# Patient Record
Sex: FEMALE | Race: WHITE | NOT HISPANIC OR LATINO | URBAN - METROPOLITAN AREA
[De-identification: names, ages, dates, MRNs, and addresses within clinical notes are randomized per-mention and may not be internally consistent; named-entity substitution may affect disease eponyms.]

---

## 2019-12-05 ENCOUNTER — HISTORICAL (OUTPATIENT)
Dept: RADIOLOGY | Facility: HOSPITAL | Age: 59
End: 2019-12-05

## 2019-12-05 LAB — TSH SERPL-ACNC: 1.77 MIU/L (ref 0.36–3.74)

## 2019-12-27 ENCOUNTER — HISTORICAL (OUTPATIENT)
Dept: RADIOLOGY | Facility: HOSPITAL | Age: 59
End: 2019-12-27

## 2020-10-28 LAB — CRC RECOMMENDATION EXT: NORMAL

## 2021-09-30 LAB
HUMAN PAPILLOMAVIRUS (HPV): NORMAL
PAP RECOMMENDATION EXT: NORMAL
PAP SMEAR: NORMAL

## 2021-11-18 LAB — BMD RECOMMENDATION EXT: NORMAL

## 2021-12-30 ENCOUNTER — HISTORICAL (OUTPATIENT)
Dept: ADMINISTRATIVE | Facility: HOSPITAL | Age: 61
End: 2021-12-30

## 2021-12-30 ENCOUNTER — HISTORICAL (OUTPATIENT)
Dept: RADIOLOGY | Facility: HOSPITAL | Age: 61
End: 2021-12-30

## 2021-12-30 LAB — TSH SERPL-ACNC: 1.4 UIU/ML (ref 0.35–4.94)

## 2022-02-18 LAB — BCS RECOMMENDATION EXT: NORMAL

## 2022-04-11 ENCOUNTER — HISTORICAL (OUTPATIENT)
Dept: ADMINISTRATIVE | Facility: HOSPITAL | Age: 62
End: 2022-04-11
Payer: COMMERCIAL

## 2022-04-27 VITALS
BODY MASS INDEX: 26.37 KG/M2 | DIASTOLIC BLOOD PRESSURE: 70 MMHG | WEIGHT: 148.81 LBS | HEIGHT: 63 IN | SYSTOLIC BLOOD PRESSURE: 112 MMHG

## 2023-01-09 RX ORDER — LEVOTHYROXINE SODIUM 75 UG/1
TABLET ORAL
Qty: 90 TABLET | Refills: 3 | OUTPATIENT
Start: 2023-01-09

## 2023-01-09 NOTE — TELEPHONE ENCOUNTER
Spoke with patient she will contact her PCP, she states she did not realized the auto refill was under Dr. Yeung's name.

## 2023-01-13 ENCOUNTER — DOCUMENTATION ONLY (OUTPATIENT)
Dept: INTERNAL MEDICINE | Facility: CLINIC | Age: 63
End: 2023-01-13
Payer: COMMERCIAL

## 2023-02-24 LAB — BCS RECOMMENDATION EXT: NORMAL

## 2023-06-08 DIAGNOSIS — E03.9 HYPOTHYROIDISM, UNSPECIFIED TYPE: Primary | ICD-10-CM

## 2023-06-08 RX ORDER — LEVOTHYROXINE SODIUM 75 UG/1
75 TABLET ORAL
Qty: 90 TABLET | Refills: 1 | Status: SHIPPED | OUTPATIENT
Start: 2023-06-08 | End: 2023-10-24 | Stop reason: SDUPTHER

## 2023-06-08 RX ORDER — LEVOTHYROXINE SODIUM 75 UG/1
75 TABLET ORAL
COMMUNITY
End: 2023-06-08 | Stop reason: SDUPTHER

## 2023-10-24 ENCOUNTER — OFFICE VISIT (OUTPATIENT)
Dept: FAMILY MEDICINE | Facility: CLINIC | Age: 63
End: 2023-10-24
Payer: COMMERCIAL

## 2023-10-24 VITALS
OXYGEN SATURATION: 97 % | TEMPERATURE: 98 F | HEIGHT: 63 IN | SYSTOLIC BLOOD PRESSURE: 138 MMHG | BODY MASS INDEX: 26.4 KG/M2 | DIASTOLIC BLOOD PRESSURE: 78 MMHG | RESPIRATION RATE: 20 BRPM | WEIGHT: 149 LBS | HEART RATE: 87 BPM

## 2023-10-24 DIAGNOSIS — S46.911A MUSCLE STRAIN OF RIGHT SCAPULAR REGION, INITIAL ENCOUNTER: Primary | ICD-10-CM

## 2023-10-24 DIAGNOSIS — Z23 IMMUNIZATION DUE: ICD-10-CM

## 2023-10-24 DIAGNOSIS — E03.9 HYPOTHYROIDISM, UNSPECIFIED TYPE: ICD-10-CM

## 2023-10-24 DIAGNOSIS — F41.9 ANXIETY: ICD-10-CM

## 2023-10-24 DIAGNOSIS — M54.9 UPPER BACK PAIN ON RIGHT SIDE: ICD-10-CM

## 2023-10-24 DIAGNOSIS — E78.5 HYPERLIPIDEMIA, UNSPECIFIED HYPERLIPIDEMIA TYPE: ICD-10-CM

## 2023-10-24 PROBLEM — R79.89 ELEVATED LFTS: Status: ACTIVE | Noted: 2023-10-24

## 2023-10-24 PROBLEM — I34.1 MVP (MITRAL VALVE PROLAPSE): Status: ACTIVE | Noted: 2023-10-24

## 2023-10-24 PROBLEM — R00.2 HEART PALPITATIONS: Status: ACTIVE | Noted: 2023-10-24

## 2023-10-24 PROBLEM — C50.911 HER2-POSITIVE CARCINOMA OF RIGHT BREAST: Status: ACTIVE | Noted: 2023-10-24

## 2023-10-24 PROBLEM — M81.0 OSTEOPOROSIS: Status: ACTIVE | Noted: 2023-10-24

## 2023-10-24 PROBLEM — I10 HYPERTENSION: Status: ACTIVE | Noted: 2023-10-24

## 2023-10-24 PROBLEM — E04.1 NONTOXIC SINGLE THYROID NODULE: Status: ACTIVE | Noted: 2023-10-24

## 2023-10-24 PROBLEM — I47.9 TACHYCARDIA, PAROXYSMAL: Status: ACTIVE | Noted: 2023-10-24

## 2023-10-24 PROBLEM — K21.9 GERD (GASTROESOPHAGEAL REFLUX DISEASE): Status: ACTIVE | Noted: 2023-10-24

## 2023-10-24 PROBLEM — Z17.31 HER2-POSITIVE CARCINOMA OF RIGHT BREAST: Status: ACTIVE | Noted: 2023-10-24

## 2023-10-24 PROCEDURE — 90471 IMMUNIZATION ADMIN: CPT | Mod: ,,, | Performed by: FAMILY MEDICINE

## 2023-10-24 PROCEDURE — 3008F PR BODY MASS INDEX (BMI) DOCUMENTED: ICD-10-PCS | Mod: CPTII,,, | Performed by: FAMILY MEDICINE

## 2023-10-24 PROCEDURE — 3008F BODY MASS INDEX DOCD: CPT | Mod: CPTII,,, | Performed by: FAMILY MEDICINE

## 2023-10-24 PROCEDURE — 90686 FLU VACCINE (QUAD) GREATER THAN OR EQUAL TO 3YO PRESERVATIVE FREE IM: ICD-10-PCS | Mod: ,,, | Performed by: FAMILY MEDICINE

## 2023-10-24 PROCEDURE — 99214 OFFICE O/P EST MOD 30 MIN: CPT | Mod: 25,,, | Performed by: FAMILY MEDICINE

## 2023-10-24 PROCEDURE — 3078F DIAST BP <80 MM HG: CPT | Mod: CPTII,,, | Performed by: FAMILY MEDICINE

## 2023-10-24 PROCEDURE — 3075F PR MOST RECENT SYSTOLIC BLOOD PRESS GE 130-139MM HG: ICD-10-PCS | Mod: CPTII,,, | Performed by: FAMILY MEDICINE

## 2023-10-24 PROCEDURE — 3075F SYST BP GE 130 - 139MM HG: CPT | Mod: CPTII,,, | Performed by: FAMILY MEDICINE

## 2023-10-24 PROCEDURE — 90471 FLU VACCINE (QUAD) GREATER THAN OR EQUAL TO 3YO PRESERVATIVE FREE IM: ICD-10-PCS | Mod: ,,, | Performed by: FAMILY MEDICINE

## 2023-10-24 PROCEDURE — 90686 IIV4 VACC NO PRSV 0.5 ML IM: CPT | Mod: ,,, | Performed by: FAMILY MEDICINE

## 2023-10-24 PROCEDURE — 1160F PR REVIEW ALL MEDS BY PRESCRIBER/CLIN PHARMACIST DOCUMENTED: ICD-10-PCS | Mod: CPTII,,, | Performed by: FAMILY MEDICINE

## 2023-10-24 PROCEDURE — 1159F MED LIST DOCD IN RCRD: CPT | Mod: CPTII,,, | Performed by: FAMILY MEDICINE

## 2023-10-24 PROCEDURE — 99214 PR OFFICE/OUTPT VISIT, EST, LEVL IV, 30-39 MIN: ICD-10-PCS | Mod: 25,,, | Performed by: FAMILY MEDICINE

## 2023-10-24 PROCEDURE — 1160F RVW MEDS BY RX/DR IN RCRD: CPT | Mod: CPTII,,, | Performed by: FAMILY MEDICINE

## 2023-10-24 PROCEDURE — 1159F PR MEDICATION LIST DOCUMENTED IN MEDICAL RECORD: ICD-10-PCS | Mod: CPTII,,, | Performed by: FAMILY MEDICINE

## 2023-10-24 PROCEDURE — 3078F PR MOST RECENT DIASTOLIC BLOOD PRESSURE < 80 MM HG: ICD-10-PCS | Mod: CPTII,,, | Performed by: FAMILY MEDICINE

## 2023-10-24 RX ORDER — ESCITALOPRAM OXALATE 20 MG/1
20 TABLET ORAL DAILY
Qty: 90 TABLET | Refills: 3 | Status: SHIPPED | OUTPATIENT
Start: 2023-10-24

## 2023-10-24 RX ORDER — CAL/D3/MAG11/ZINC/COP/MANG/BOR 600 MG-800
TABLET ORAL 2 TIMES DAILY
COMMUNITY

## 2023-10-24 RX ORDER — MILK THISTLE 150 MG
CAPSULE ORAL DAILY
COMMUNITY

## 2023-10-24 RX ORDER — DICLOFENAC SODIUM 75 MG/1
75 TABLET, DELAYED RELEASE ORAL 2 TIMES DAILY WITH MEALS
Qty: 30 TABLET | Refills: 1 | Status: SHIPPED | OUTPATIENT
Start: 2023-10-24

## 2023-10-24 RX ORDER — ESCITALOPRAM OXALATE 20 MG/1
20 TABLET ORAL DAILY
COMMUNITY
Start: 2023-10-23 | End: 2023-10-24 | Stop reason: SDUPTHER

## 2023-10-24 RX ORDER — RALOXIFENE HYDROCHLORIDE 60 MG/1
60 TABLET, FILM COATED ORAL DAILY
COMMUNITY
Start: 2023-10-11

## 2023-10-24 RX ORDER — ROSUVASTATIN CALCIUM 20 MG/1
20 TABLET, COATED ORAL NIGHTLY
COMMUNITY
Start: 2023-08-09

## 2023-10-24 RX ORDER — LEVOTHYROXINE SODIUM 75 UG/1
75 TABLET ORAL
Qty: 90 TABLET | Refills: 3 | Status: SHIPPED | OUTPATIENT
Start: 2023-10-24

## 2023-10-24 RX ORDER — MULTIVITAMIN
TABLET ORAL DAILY
COMMUNITY

## 2023-10-24 RX ORDER — DIGOXIN 250 MCG
TABLET ORAL
COMMUNITY

## 2023-10-24 NOTE — LETTER
AUTHORIZATION FOR RELEASE OF   CONFIDENTIAL INFORMATION    Dear Dr. Barrios,    We are seeing Sally Hunt, date of birth 1960, in the clinic at [facility]. Donte Quesada Sr., MD is the patient's PCP. Sally Hunt has an outstanding lab/procedure at the time we reviewed her chart. In order to help keep her health information updated, she has authorized us to request the following medical record(s):        (  )  MAMMOGRAM                                      (  )  COLONOSCOPY      (  )  PAP SMEAR                                          (  )  OUTSIDE LAB RESULTS     (  )  DEXA SCAN                                          (  )  EYE EXAM            (  )  FOOT EXAM                                          (  )  ENTIRE RECORD     (  )  OUTSIDE IMMUNIZATIONS                 ( X )  ___Last progess notes____________         Please fax records to [facility], Bourgeois, Leonard Jb. Sr., MD, [phone]     If you have any questions, please contact Yvonne at ([phone]        Patient Name: Sally Hunt  : 1960  Patient Phone #: [phone]

## 2023-10-24 NOTE — ASSESSMENT & PLAN NOTE
Most recent  mg/dL on 10/12/2023.  I told the patient that we would like & expect her LDL to be below 100.  She says she will try and be more attentive to her diet.

## 2023-10-24 NOTE — LETTER
AUTHORIZATION FOR RELEASE OF   CONFIDENTIAL INFORMATION    Dear   We are seeing Sally Hunt, date of birth 1960, in the clinic at [facility]. Donte Quesada Sr., MD is the patient's PCP. Sally Hunt has an outstanding lab/procedure at the time we reviewed her chart. In order to help keep her health information updated, she has authorized us to request the following medical record(s):        (X  )  MAMMOGRAM                                      (  )  COLONOSCOPY      (X  )  PAP SMEAR                                          (  )  OUTSIDE LAB RESULTS     (  X)  DEXA SCAN                                          (  )  EYE EXAM            (  )  FOOT EXAM                                          (  )  ENTIRE RECORD     (  )  OUTSIDE IMMUNIZATIONS                 (  )  _______________         Please fax records to [facility], Bourgeois, Leonard Jb. Sr., MD, [phone]     If you have any questions, please contact Yvonne  at [phone]          Patient Name: Sally Hunt  : 1960  Patient Phone #: [phone]

## 2023-10-24 NOTE — PROGRESS NOTES
Patient Name: Sally Hunt     Patient ID: [id number]     Chief Complaint: Results (Here for lab results.)      HPI:     Sally Hunt is a 62 y.o. female here today for follow up for Hypothyroid and lab work results. Reviewed and discussed lab work results. Patient reports having right upper back pain.  She denies any antecedent injury.    Past Medical History:   Diagnosis Date    Anxiety     Elevated LFTs     GERD (gastroesophageal reflux disease)     Heart palpitations     HER2-positive carcinoma of right breast 2013    Hyperlipidemia     Hypertension     Hypothyroidism     MVP (mitral valve prolapse)     Nontoxic single thyroid nodule     right side    Osteoporosis         Past Surgical History:   Procedure Laterality Date    COLONOSCOPY  10/28/2020    DILATION AND CURETTAGE OF UTERUS  2019    times 2    LUMPECTOMY, BREAST Right 12/2013    THYROID LOBECTOMY Left 06/2020        Social History     Socioeconomic History    Marital status:    Tobacco Use    Smoking status: Never    Smokeless tobacco: Never   Substance and Sexual Activity    Alcohol use: Not Currently     Comment: social        Current Outpatient Medications   Medication Instructions    B complex 11-folic-C-biot-zinc 3-239-088-50 mg-mg-mcg-mg Tab Oral, 2 times daily    fgp-H0-bff98-zinc--zuri-bor (CALTRATE 600-D PLUS MINERALS) 600 mg calcium- 800 unit-50 mg Tab 2 times daily    diclofenac (VOLTAREN) 75 mg, Oral, 2 times daily with meals    digoxin (LANOXIN) 250 mcg tablet TAKE 1 TABLET BY MOUTH EVERY DAY FOR 5 DAYS THEN 2 TABLETS OTHER 2 DAYS    EScitalopram oxalate (LEXAPRO) 20 mg, Oral, Daily    levocetirizine dihydrochloride (XYZAL ORAL) 10 mg, Oral, As needed (PRN)    levothyroxine (SYNTHROID) 75 mcg, Oral, Before breakfast    multivit with min-folic acid (ONE-A-DAY WOMEN'S 50 PLUS) 0.4 mg Tab Daily    quercetin 500 mg Cap Daily    raloxifene (EVISTA) 60 mg, Oral, Daily    rosuvastatin (CRESTOR) 20 mg, Oral, Nightly       Review of  "patient's allergies indicates:   Allergen Reactions    Zithromax [azithromycin] Other (See Comments)     Due to Digoxin        Immunization History   Administered Date(s) Administered    COVID-19, MRNA, LN-S, PF (Pfizer) (Purple Cap) 04/14/2021, 05/05/2021    Influenza - Quadrivalent - PF *Preferred* (6 months and older) 10/24/2023    Influenza - Trivalent - PF (ADULT) 10/19/2017, 11/13/2018, 10/04/2021    Zoster 05/12/2019       Patient Care Team:  Donte Quesada Sr., MD as PCP - General  YuniorVenkat mcdonald MD as Consulting Physician (Obstetrics and Gynecology)  Ac Barrios MD as Consulting Physician (Cardiology)  Yahir Caleor MD as Consulting Physician (Oncology)  Sumit Valentino MD as Consulting Physician (Gastroenterology)  Reinaldo Yeung MD as Consulting Physician (Endocrinology)     Subjective:     Review of Systems    10 point review of systems conducted, negative except as stated in the history of present illness. See HPI for details.    Objective:     Visit Vitals  /78 (BP Location: Left arm, Patient Position: Sitting, BP Method: Medium (Manual))   Pulse 87   Temp 97.7 °F (36.5 °C)   Resp 20   Ht 5' 3" (1.6 m)   Wt 67.6 kg (149 lb)   SpO2 97%   BMI 26.39 kg/m²       Physical Exam  Constitutional:       Appearance: Normal appearance.   HENT:      Head: Normocephalic and atraumatic.   Cardiovascular:      Rate and Rhythm: Normal rate and regular rhythm.   Pulmonary:      Effort: Pulmonary effort is normal.      Breath sounds: Normal breath sounds.   Abdominal:      Palpations: Abdomen is soft.      Tenderness: There is no abdominal tenderness.   Musculoskeletal:         General: No swelling or tenderness. Normal range of motion.      Cervical back: Normal range of motion and neck supple.      Right lower leg: No edema.      Left lower leg: No edema.      Comments: Examination the patient's right shoulder is normal.  She has good range of motion including full abduction, " internal rotation and external rotation.  Neck exam is normal with good range of motion and no tenderness.  There is some mild tenderness over the right upper scapular area.   Skin:     General: Skin is warm and dry.   Neurological:      General: No focal deficit present.      Mental Status: She is alert and oriented to person, place, and time.   Psychiatric:         Mood and Affect: Mood normal.           Assessment:       ICD-10-CM ICD-9-CM   1. Muscle strain of right scapular region, initial encounter  S46.911A 840.9   2. Upper back pain on right side  M54.9 724.5   3. Hyperlipidemia, unspecified hyperlipidemia type  E78.5 272.4   4. Hypothyroidism, unspecified type  E03.9 244.9   5. Anxiety  F41.9 300.00   6. Immunization due  Z23 V05.9        Plan:     1. Muscle strain of right scapular region, initial encounter  Overview:  Patient has a strain of the right scapular area.  We will prescribe an anti-inflammatory agent such as Mobic; she was instructed to apply warm compresses to the affected area.    Orders:  -     diclofenac (VOLTAREN) 75 MG EC tablet; Take 1 tablet (75 mg total) by mouth 2 (two) times daily with meals.  Dispense: 30 tablet; Refill: 1  -     X-Ray Chest PA And Lateral; Future; Expected date: 10/24/2023    2. Upper back pain on right side  Comments:  Start Diclofenac 75 mg BID PC. Will order EPA and Lateral CXR. Heat therapy for mild to moderate pain and ice therapy for moderate to severe pain.  Overview:  Start Diclofenac 75 mg BID PC. Will order EPA and Lateral CXR to rule out any occult cause of back or shoulder pain. Heat therapy for mild to moderate pain and ice therapy for moderate to severe pain.    Orders:  -     diclofenac (VOLTAREN) 75 MG EC tablet; Take 1 tablet (75 mg total) by mouth 2 (two) times daily with meals.  Dispense: 30 tablet; Refill: 1  -     X-Ray Chest PA And Lateral; Future; Expected date: 10/24/2023    3. Hyperlipidemia, unspecified hyperlipidemia  type  Overview:  Presently taking Crestor 20 mg nightly as prescribed per Cardiology.  Followed by Cardiology.  Stressed importance of dietary modifications. Follow a low cholesterol, low saturated fat diet with less that 200mg of cholesterol a day.  Avoid fried foods and high saturated fats (high saturated fats less than 7% of calories).  Add Flax Seed/Fish Oil supplements to diet. Increase dietary fiber.  Regular exercise can reduce LDL and raise HDL. Stressed importance of physical activity 5 times per week for 30 minutes per day.     Assessment & Plan:  Most recent  mg/dL on 10/12/2023.  I told the patient that we would like & expect her LDL to be below 100.  She says she will try and be more attentive to her diet.      Orders:  -     Lipid Panel; Future; Expected date: 04/24/2024  -     CK; Future; Expected date: 04/24/2024  -     Hepatic Function Panel; Future; Expected date: 04/24/2024    4. Hypothyroidism, unspecified type  Overview:  Continue with Levothyroxine 75 mcg daily.  Take medicine on an empty stomach with water (no other medications or beverages). Wait 30 minutes to eat or drink.  Report any symptoms of thinning hair, breaking nails, fatigue, weight gain or loss, palpitations.     Assessment & Plan:  Most recent TSH 0.889 uIU/mL on 10/12/2023.  Patient is at goal.    Orders:  -     levothyroxine (SYNTHROID) 75 MCG tablet; Take 1 tablet (75 mcg total) by mouth before breakfast.  Dispense: 90 tablet; Refill: 3  -     TSH; Future; Expected date: 04/24/2024    5. Anxiety  Overview:  Continue with Lexapro 20 mg daily.  Practice deep breathing or abdominal breathing exercises when anxiety occurs.  Exercise daily. Get sunlight daily.  Avoid caffeine, alcohol and stimulants.  Practice positive phrases and repeat throughout the day, along with yoga and relaxation techniques.  Set healthy boundaries, avoid people and conversations that increase stress.  Educated patient on the risks of serotonin based  medications such as serotonin modulators and SSRIs/SNRIs including common side effects of nausea, GI upset, headache dizziness as well as the rare risk for worsening symptoms of depression including development of suicidal thoughts or ideations, and serotonin syndrome.   Discussed benefits of medication not becoming noticeable until up to 6 weeks from start date.   Reports any symptoms of suicidal or homicidal ideations immediately, if clinic is closed go to nearest emergency room.  Discussed possibility of using benzodiazepines    Assessment & Plan:  Patient is well controlled.    Orders:  -     EScitalopram oxalate (LEXAPRO) 20 MG tablet; Take 1 tablet (20 mg total) by mouth once daily.  Dispense: 90 tablet; Refill: 3    6. Immunization due  -     Influenza - Quadrivalent (PF)        [x] Discussed lab findings with the patient.  [x] Discussed diet, exercise and if appropriate, weight loss.  [x] Instructions and information, including risks and benefits of prescribed medication(s) have been reviewed with the patient and patient verbalizes understanding. Questions have been answered to the patient's satisfaction.  [x] Appropriate counseling has been given regarding anxiety and depressive issues that were discussed today.  [] Any lab drawn today will be reviewed by physician at the time it is received and appropriate recommendations bill be made and discussed with patient.     Follow up in about 6 months (around 4/24/2024) for Follow Up, With Fasting Labs prior to visit, Nurse Visit 1 month for Pneumonia Vaccine.   In addition to their scheduled follow up, the patient has also been instructed to follow up on as needed basis.     Future Appointments   Date Time Provider Department Center   4/24/2024  8:00 AM LAB, [facility]   4/29/2024  1:30 PM Donte Quesada Sr., MD [facility] Quesada Sr, MD

## 2023-10-25 ENCOUNTER — DOCUMENTATION ONLY (OUTPATIENT)
Dept: FAMILY MEDICINE | Facility: CLINIC | Age: 63
End: 2023-10-25
Payer: COMMERCIAL

## 2023-10-25 LAB — PAP RECOMMENDATION EXT: NORMAL

## 2024-02-29 LAB — BCS RECOMMENDATION EXT: NORMAL

## 2024-03-06 ENCOUNTER — DOCUMENTATION ONLY (OUTPATIENT)
Dept: FAMILY MEDICINE | Facility: CLINIC | Age: 64
End: 2024-03-06
Payer: COMMERCIAL

## 2024-05-14 ENCOUNTER — TELEPHONE (OUTPATIENT)
Dept: FAMILY MEDICINE | Facility: CLINIC | Age: 64
End: 2024-05-14
Payer: COMMERCIAL

## 2024-05-14 DIAGNOSIS — E04.2 MULTINODULAR THYROID: ICD-10-CM

## 2024-05-14 DIAGNOSIS — E03.9 HYPOTHYROIDISM, UNSPECIFIED TYPE: Primary | ICD-10-CM

## 2024-05-14 NOTE — LETTER
AUTHORIZATION FOR RELEASE OF   CONFIDENTIAL INFORMATION    Dear Anjana,    We are seeing Sally Hunt, date of birth 1960, in the clinic at [facility]. Donte Quesada Sr., MD is the patient's PCP. Sally Hunt has an outstanding lab/procedure at the time we reviewed her chart. In order to help keep her health information updated, she has authorized us to request the following medical record(s):        (  )  MAMMOGRAM                                      (  )  COLONOSCOPY      (  )  PAP SMEAR                                          (  )  OUTSIDE LAB RESULTS     (  )  DEXA SCAN                                          (  )  EYE EXAM            (  )  FOOT EXAM                                          (  )  ENTIRE RECORD     (  )  OUTSIDE IMMUNIZATIONS                 ( X )  Last Office Note _______________         Please fax records to [facility], Bourgeois, Leonard Jb. Sr., MD, [phone]     If you have any questions, please contact Jackie at [phone].          Patient Name: Sally Hunt  : 1960  Patient Phone #: [phone]

## 2024-05-14 NOTE — TELEPHONE ENCOUNTER
----- Message from Keesha Park sent at 5/1/2024  3:23 PM CDT -----   Per Dr. Conner.  Requesting PCP order U/S of thyroid if patient is not having any issues.  States no issues at this time.

## 2024-05-14 NOTE — TELEPHONE ENCOUNTER
Dolores,    Last note is in cerner.  Please sort responding to this request.    Sincerely,  Reinaldo Yeung MD

## 2024-05-14 NOTE — TELEPHONE ENCOUNTER
Previous external note reviewed.  Orders placed per recommendations.        ICD-10-CM ICD-9-CM   1. Hypothyroidism, unspecified type  E03.9 244.9   2. Multinodular thyroid  E04.2 241.1     Orders Placed This Encounter    US Thyroid    TSH

## 2024-05-28 DIAGNOSIS — E78.5 HYPERLIPIDEMIA, UNSPECIFIED HYPERLIPIDEMIA TYPE: ICD-10-CM

## 2024-05-28 DIAGNOSIS — Z79.899 ENCOUNTER FOR LONG-TERM (CURRENT) USE OF OTHER MEDICATIONS: ICD-10-CM

## 2024-05-28 DIAGNOSIS — E03.9 HYPOTHYROIDISM, UNSPECIFIED TYPE: Primary | ICD-10-CM

## 2024-05-29 LAB
ALBUMIN SERPL-MCNC: 3.9 G/DL (ref 3.9–4.9)
ALP SERPL-CCNC: 91 IU/L (ref 44–121)
ALT SERPL-CCNC: 21 IU/L (ref 0–32)
AST SERPL-CCNC: 23 IU/L (ref 0–40)
BILIRUB DIRECT SERPL-MCNC: <0.2 MG/DL (ref 0–0.4)
BILIRUB SERPL-MCNC: 0.2 MG/DL (ref 0–1.2)
CHOLEST SERPL-MCNC: 161 MG/DL (ref 100–199)
CK SERPL-CCNC: 90 U/L (ref 32–182)
HDLC SERPL-MCNC: 46 MG/DL
LDLC SERPL CALC-MCNC: 80 MG/DL (ref 0–99)
PROT SERPL-MCNC: 6.3 G/DL (ref 6–8.5)
TRIGL SERPL-MCNC: 210 MG/DL (ref 0–149)
TSH SERPL DL<=0.005 MIU/L-ACNC: 1.56 UIU/ML (ref 0.45–4.5)
VLDLC SERPL CALC-MCNC: 35 MG/DL (ref 5–40)

## 2024-06-04 PROBLEM — Z00.00 HEALTH CARE MAINTENANCE: Status: ACTIVE | Noted: 2024-06-04

## 2024-06-04 NOTE — PROGRESS NOTES
Family Medicine    Patient ID: [id number]     Chief Complaint: Results (Here for results and would like to discuss her sinusitis)      HPI:     Sally Hunt is a 63 y.o. female here today for a follow up on labs.    Past Medical History:   Diagnosis Date    Anxiety     Elevated LFTs     GERD (gastroesophageal reflux disease)     Heart palpitations     HER2-positive carcinoma of right breast 2013    Hyperlipidemia     Hypertension     Hypothyroidism     MVP (mitral valve prolapse)     Nontoxic single thyroid nodule     right side    Osteoporosis     Venous reflux         Past Surgical History:   Procedure Laterality Date    BREAST SURGERY  December 2013    COLONOSCOPY  10/28/2020    DILATION AND CURETTAGE OF UTERUS  2019    times 2    LUMPECTOMY, BREAST Right 12/2013    THYROID LOBECTOMY Left 06/2020        Social History     Tobacco Use    Smoking status: Never    Smokeless tobacco: Never   Substance and Sexual Activity    Alcohol use: Not Currently     Comment: Every once in a while    Drug use: Never    Sexual activity: Yes     Partners: Female     Birth control/protection: Post-menopausal        Current Outpatient Medications   Medication Instructions    B complex 11-folic-C-biot-zinc 1-810-596-50 mg-mg-mcg-mg Tab Oral, 2 times daily    qmj-H6-mvi18-zinc--zuri-bor (CALTRATE 600-D PLUS MINERALS) 600 mg calcium- 800 unit-50 mg Tab 2 times daily    cefdinir (OMNICEF) 300 mg, Oral, 2 times daily    digoxin (LANOXIN) 250 mcg tablet TAKE 1 TABLET BY MOUTH EVERY DAY FOR 5 DAYS THEN 2 TABLETS OTHER 2 DAYS    EScitalopram oxalate (LEXAPRO) 20 mg, Oral, Daily    levocetirizine dihydrochloride (XYZAL ORAL) 10 mg, Oral, As needed (PRN)    levothyroxine (SYNTHROID) 75 mcg, Oral, Before breakfast    losartan (COZAAR) 25 mg, Oral, Daily    multivit with min-folic acid (ONE-A-DAY WOMEN'S 50 PLUS) 0.4 mg Tab Daily    predniSONE (DELTASONE) 40 mg, Oral, Daily    quercetin 500 mg Cap Daily    raloxifene (EVISTA) 60 mg, Oral,  "Daily    rosuvastatin (CRESTOR) 20 mg, Oral, Nightly       Review of patient's allergies indicates:   Allergen Reactions    Zithromax [azithromycin] Other (See Comments)     Due to Digoxin        Patient Care Team:  Donte Quesada Sr., MD as PCP - General  Venkat Mojica MD as Consulting Physician (Obstetrics and Gynecology)  Ac Barrios MD as Consulting Physician (Cardiology)  Yahir Calero MD as Consulting Physician (Oncology)  Sumit Valentino MD as Consulting Physician (Gastroenterology)  Reinaldo Yeung MD as Consulting Physician (Endocrinology)     Subjective:     Review of Systems   Constitutional:  Negative for activity change and unexpected weight change.   HENT:  Positive for rhinorrhea. Negative for hearing loss and trouble swallowing.    Eyes:  Negative for discharge and visual disturbance.   Respiratory:  Negative for chest tightness and wheezing.    Cardiovascular:  Negative for chest pain and palpitations.   Gastrointestinal:  Negative for blood in stool, constipation, diarrhea and vomiting.   Endocrine: Negative for polydipsia and polyuria.   Genitourinary:  Negative for difficulty urinating, dysuria, hematuria and menstrual problem.   Musculoskeletal:  Negative for arthralgias, joint swelling and neck pain.   Neurological:  Positive for headaches. Negative for weakness.   Psychiatric/Behavioral:  Negative for confusion and dysphoric mood.        12 point review of systems conducted, negative except as stated in the history of present illness. See HPI for details.    Objective:     Visit Vitals  /84   Pulse 74   Temp 97.8 °F (36.6 °C)   Resp 18   Ht 5' 3" (1.6 m)   Wt 69.9 kg (154 lb)   SpO2 96%   BMI 27.28 kg/m²       Physical Exam  Constitutional:       General: She is not in acute distress.     Appearance: Normal appearance. She is not ill-appearing.   HENT:      Head: Normocephalic and atraumatic.      Right Ear: Tympanic membrane, ear canal and external ear " "normal. There is no impacted cerumen.      Left Ear: Tympanic membrane, ear canal and external ear normal. There is no impacted cerumen.      Nose: No congestion.      Mouth/Throat:      Pharynx: Oropharynx is clear. No oropharyngeal exudate or posterior oropharyngeal erythema.   Eyes:      General:         Right eye: No discharge.         Left eye: No discharge.      Extraocular Movements: Extraocular movements intact.      Conjunctiva/sclera: Conjunctivae normal.   Cardiovascular:      Rate and Rhythm: Normal rate and regular rhythm.   Pulmonary:      Effort: Pulmonary effort is normal. No respiratory distress.      Breath sounds: Normal breath sounds.   Musculoskeletal:      Right lower leg: No edema.      Left lower leg: No edema.   Skin:     Capillary Refill: Capillary refill takes less than 2 seconds.   Neurological:      Mental Status: She is alert and oriented to person, place, and time. Mental status is at baseline.   Psychiatric:         Mood and Affect: Mood normal.         Behavior: Behavior normal.         Thought Content: Thought content normal.         Judgment: Judgment normal.         Labs Reviewed:     Chemistry:  Lab Results   Component Value Date     10/12/2023    K 4.6 10/12/2023    BUN 18 10/12/2023    CREATININE 0.70 10/12/2023    EGFRNORACEVR 97 10/12/2023    CALCIUM 9.6 10/12/2023    LABPROT 13.0 06/17/2020    ALBUMIN 3.9 05/28/2024    BILIDIR <0.20 05/28/2024    AST 23 05/28/2024    ALT 21 05/28/2024    BLVTKPOP97OR 52.9 10/12/2023    TSH 1.560 05/28/2024        No results found for: "HGBA1C", "MICROALBCREA"     Hematology:  Lab Results   Component Value Date    WBC 8.7 10/12/2023    HGB 12.6 10/12/2023    HCT 41.3 10/12/2023     10/12/2023       Lipid Panel:  Lab Results   Component Value Date    CHOL 161 05/28/2024    HDL 46 05/28/2024    TRIG 210 (H) 05/28/2024        Urine:  No results found for: "COLORUA", "APPEARANCEUA", "SGUA", "PHUA", "PROTEINUA", "GLUCOSEUA", " ""KETONESUA", "BLOODUA", "NITRITESUA", "LEUKOCYTESUR", "RBCUA", "WBCUA", "BACTERIA", "SQEPUA", "HYALINECASTS", "CREATRANDUR", "PROTEINURINE", "UPROTCREA"     Assessment:       ICD-10-CM ICD-9-CM   1. Hyperlipidemia, unspecified hyperlipidemia type  E78.5 272.4   2. Hypothyroidism, unspecified type  E03.9 244.9   3. HER2-positive carcinoma of right breast  C50.911 174.9   4. Acute recurrent maxillary sinusitis  J01.01 461.0   5. Visit for annual health examination  Z00.00 V70.0        Plan:     1. Hyperlipidemia, unspecified hyperlipidemia type  Overview:  Presently taking Crestor 20 mg nightly as prescribed per Cardiology.  Followed by Cardiology.  Stressed importance of dietary modifications. Follow a low cholesterol, low saturated fat diet with less that 200mg of cholesterol a day.  Avoid fried foods and high saturated fats (high saturated fats less than 7% of calories).  Add Flax Seed/Fish Oil supplements to diet. Increase dietary fiber.  Regular exercise can reduce LDL and raise HDL. Stressed importance of physical activity 5 times per week for 30 minutes per day.     Orders:  -     Hemoglobin A1C; Future; Expected date: 12/05/2024  -     CBC Auto Differential; Future; Expected date: 12/05/2024  -     Comprehensive Metabolic Panel; Future; Expected date: 12/05/2024  -     TSH; Future; Expected date: 12/05/2024  -     Lipid Panel; Future; Expected date: 12/05/2024    2. Hypothyroidism, unspecified type  Overview:  Continue with Levothyroxine 75 mcg daily.  Take medicine on an empty stomach with water (no other medications or beverages). Wait 30 minutes to eat or drink.  Report any symptoms of thinning hair, breaking nails, fatigue, weight gain or loss, palpitations.       3. HER2-positive carcinoma of right breast  Overview:  Patient presented in 2013 with biopsy-proven lymph node positive right-sided infiltrating ductal carcinoma. Her tumor was estrogen and progesterone receptor negative and HER-2/tram positive. " She received neoadjuvant Taxotere, carboplatin and Herceptin. She then underwent bilateral mastectomy. A 0.2 cm residual primary tumor was present with negative lymph node involvement. She completed 1 year of Herceptin.  Followed annually by oncology.      4. Acute recurrent maxillary sinusitis  Comments:  Seasonal.  For 3 days, green discharge.  No fevers.  Facial tenderness.  Requested antibiotics and steroids.  We will fill  Orders:  -     cefdinir (OMNICEF) 300 MG capsule; Take 1 capsule (300 mg total) by mouth 2 (two) times daily. for 7 days  Dispense: 14 capsule; Refill: 0  -     predniSONE (DELTASONE) 20 MG tablet; Take 2 tablets (40 mg total) by mouth once daily. for 4 days  Dispense: 8 tablet; Refill: 0    5. Visit for annual health examination  -     Hemoglobin A1C; Future; Expected date: 12/05/2024  -     CBC Auto Differential; Future; Expected date: 12/05/2024  -     Comprehensive Metabolic Panel; Future; Expected date: 12/05/2024  -     TSH; Future; Expected date: 12/05/2024  -     Lipid Panel; Future; Expected date: 12/05/2024       6. Two CMPs is on file have significantly elevated glucose.  Patient reports she was not fasting for either 1 of them.  We will get an A1c in 6 months to see where her sugars are at.    No follow-ups on file. In addition to their scheduled follow up, the patient has also been instructed to follow up on as needed basis.     Future Appointments   Date Time Provider Department Center   12/5/2024  8:20 AM LAB, [facility]   12/9/2024  9:00 AM Donte Quesada Sr., MD [place] MD

## 2024-06-05 ENCOUNTER — OFFICE VISIT (OUTPATIENT)
Dept: FAMILY MEDICINE | Facility: CLINIC | Age: 64
End: 2024-06-05
Payer: COMMERCIAL

## 2024-06-05 VITALS
RESPIRATION RATE: 18 BRPM | OXYGEN SATURATION: 96 % | SYSTOLIC BLOOD PRESSURE: 136 MMHG | DIASTOLIC BLOOD PRESSURE: 84 MMHG | HEIGHT: 63 IN | WEIGHT: 154 LBS | BODY MASS INDEX: 27.29 KG/M2 | TEMPERATURE: 98 F | HEART RATE: 74 BPM

## 2024-06-05 DIAGNOSIS — E03.9 HYPOTHYROIDISM, UNSPECIFIED TYPE: ICD-10-CM

## 2024-06-05 DIAGNOSIS — C50.911 HER2-POSITIVE CARCINOMA OF RIGHT BREAST: ICD-10-CM

## 2024-06-05 DIAGNOSIS — E78.5 HYPERLIPIDEMIA, UNSPECIFIED HYPERLIPIDEMIA TYPE: Primary | ICD-10-CM

## 2024-06-05 DIAGNOSIS — Z00.00 VISIT FOR ANNUAL HEALTH EXAMINATION: ICD-10-CM

## 2024-06-05 DIAGNOSIS — J01.01 ACUTE RECURRENT MAXILLARY SINUSITIS: ICD-10-CM

## 2024-06-05 PROCEDURE — 1160F RVW MEDS BY RX/DR IN RCRD: CPT | Mod: CPTII,,, | Performed by: FAMILY MEDICINE

## 2024-06-05 PROCEDURE — 3008F BODY MASS INDEX DOCD: CPT | Mod: CPTII,,, | Performed by: FAMILY MEDICINE

## 2024-06-05 PROCEDURE — 1159F MED LIST DOCD IN RCRD: CPT | Mod: CPTII,,, | Performed by: FAMILY MEDICINE

## 2024-06-05 PROCEDURE — 99213 OFFICE O/P EST LOW 20 MIN: CPT | Mod: ,,, | Performed by: FAMILY MEDICINE

## 2024-06-05 PROCEDURE — 3075F SYST BP GE 130 - 139MM HG: CPT | Mod: CPTII,,, | Performed by: FAMILY MEDICINE

## 2024-06-05 PROCEDURE — 3079F DIAST BP 80-89 MM HG: CPT | Mod: CPTII,,, | Performed by: FAMILY MEDICINE

## 2024-06-05 PROCEDURE — 4010F ACE/ARB THERAPY RXD/TAKEN: CPT | Mod: CPTII,,, | Performed by: FAMILY MEDICINE

## 2024-06-05 RX ORDER — PREDNISONE 20 MG/1
40 TABLET ORAL DAILY
Qty: 8 TABLET | Refills: 0 | Status: SHIPPED | OUTPATIENT
Start: 2024-06-05 | End: 2024-06-09

## 2024-06-05 RX ORDER — LOSARTAN POTASSIUM 25 MG/1
25 TABLET ORAL DAILY
COMMUNITY
Start: 2024-06-01

## 2024-06-05 RX ORDER — CEFDINIR 300 MG/1
300 CAPSULE ORAL 2 TIMES DAILY
Qty: 14 CAPSULE | Refills: 0 | Status: SHIPPED | OUTPATIENT
Start: 2024-06-05 | End: 2024-06-12

## 2024-09-09 PROBLEM — Z00.00 HEALTH CARE MAINTENANCE: Status: RESOLVED | Noted: 2024-06-04 | Resolved: 2024-09-09

## 2024-10-16 ENCOUNTER — TELEPHONE (OUTPATIENT)
Dept: PHARMACY | Facility: CLINIC | Age: 64
End: 2024-10-16
Payer: COMMERCIAL

## 2024-10-16 NOTE — TELEPHONE ENCOUNTER
[facility]Population [facility] Chart Review & Patient Outreach Details For Medication Adherence Project    Reason for Outreach Encounter: 3rd Party payor non-compliance report ([employer], [employer], C, etc)  2.  Patient Outreach Method: Reviewed Patient Chart  3.   Medication in question: rosuvastatin    LAST FILLED: 9/21/24 for 90 day supply  Hyperlipidemia Medications               rosuvastatin (CRESTOR) 20 MG tablet Take 20 mg by mouth every evening.               4.  Reviewed and or Updates Made To: Patient Chart  5. Outreach Outcomes and/or actions taken: Patient filled medication and is on track to be adherent

## 2024-10-24 DIAGNOSIS — E03.9 HYPOTHYROIDISM, UNSPECIFIED TYPE: ICD-10-CM

## 2024-10-24 RX ORDER — LEVOTHYROXINE SODIUM 75 UG/1
75 TABLET ORAL
Qty: 90 TABLET | Refills: 3 | Status: SHIPPED | OUTPATIENT
Start: 2024-10-24

## 2024-11-22 DIAGNOSIS — F41.9 ANXIETY: ICD-10-CM

## 2024-11-22 RX ORDER — ESCITALOPRAM OXALATE 20 MG/1
20 TABLET ORAL DAILY
Qty: 90 TABLET | Refills: 3 | Status: SHIPPED | OUTPATIENT
Start: 2024-11-22

## 2024-12-02 ENCOUNTER — TELEPHONE (OUTPATIENT)
Dept: FAMILY MEDICINE | Facility: CLINIC | Age: 64
End: 2024-12-02

## 2024-12-02 NOTE — TELEPHONE ENCOUNTER
Patient called to cancel lab and result appointment . She stated that she will call back when ready to schedule. Has a lot [id number] on.